# Patient Record
Sex: FEMALE | Race: BLACK OR AFRICAN AMERICAN | NOT HISPANIC OR LATINO | URBAN - METROPOLITAN AREA
[De-identification: names, ages, dates, MRNs, and addresses within clinical notes are randomized per-mention and may not be internally consistent; named-entity substitution may affect disease eponyms.]

---

## 2022-08-05 ENCOUNTER — EMERGENCY (EMERGENCY)
Facility: HOSPITAL | Age: 27
LOS: 1 days | Discharge: HOME | End: 2022-08-05
Attending: STUDENT IN AN ORGANIZED HEALTH CARE EDUCATION/TRAINING PROGRAM
Payer: COMMERCIAL

## 2022-08-05 VITALS
OXYGEN SATURATION: 98 % | HEART RATE: 80 BPM | DIASTOLIC BLOOD PRESSURE: 68 MMHG | SYSTOLIC BLOOD PRESSURE: 111 MMHG | RESPIRATION RATE: 18 BRPM | TEMPERATURE: 99 F | WEIGHT: 139.99 LBS

## 2022-08-05 LAB
ALBUMIN SERPL ELPH-MCNC: 4.3 G/DL — SIGNIFICANT CHANGE UP (ref 3.3–5.2)
ALP SERPL-CCNC: 94 U/L — SIGNIFICANT CHANGE UP (ref 40–120)
ALT FLD-CCNC: 15 U/L — SIGNIFICANT CHANGE UP
ANION GAP SERPL CALC-SCNC: 11 MMOL/L — SIGNIFICANT CHANGE UP (ref 5–17)
APPEARANCE UR: CLEAR — SIGNIFICANT CHANGE UP
AST SERPL-CCNC: 19 U/L — SIGNIFICANT CHANGE UP
BASOPHILS # BLD AUTO: 0.05 K/UL — SIGNIFICANT CHANGE UP (ref 0–0.2)
BASOPHILS NFR BLD AUTO: 0.9 % — SIGNIFICANT CHANGE UP (ref 0–2)
BILIRUB SERPL-MCNC: 0.2 MG/DL — LOW (ref 0.4–2)
BILIRUB UR-MCNC: NEGATIVE — SIGNIFICANT CHANGE UP
BUN SERPL-MCNC: 8.1 MG/DL — SIGNIFICANT CHANGE UP (ref 8–20)
CALCIUM SERPL-MCNC: 8.7 MG/DL — SIGNIFICANT CHANGE UP (ref 8.4–10.5)
CHLORIDE SERPL-SCNC: 101 MMOL/L — SIGNIFICANT CHANGE UP (ref 98–107)
CO2 SERPL-SCNC: 23 MMOL/L — SIGNIFICANT CHANGE UP (ref 22–29)
COLOR SPEC: YELLOW — SIGNIFICANT CHANGE UP
CREAT SERPL-MCNC: 0.55 MG/DL — SIGNIFICANT CHANGE UP (ref 0.5–1.3)
DIFF PNL FLD: NEGATIVE — SIGNIFICANT CHANGE UP
EGFR: 129 ML/MIN/1.73M2 — SIGNIFICANT CHANGE UP
EOSINOPHIL # BLD AUTO: 0.1 K/UL — SIGNIFICANT CHANGE UP (ref 0–0.5)
EOSINOPHIL NFR BLD AUTO: 1.7 % — SIGNIFICANT CHANGE UP (ref 0–6)
EPI CELLS # UR: SIGNIFICANT CHANGE UP
GIANT PLATELETS BLD QL SMEAR: PRESENT — SIGNIFICANT CHANGE UP
GLUCOSE SERPL-MCNC: 89 MG/DL — SIGNIFICANT CHANGE UP (ref 70–99)
GLUCOSE UR QL: NEGATIVE MG/DL — SIGNIFICANT CHANGE UP
HCG SERPL-ACNC: <4 MIU/ML — SIGNIFICANT CHANGE UP
HCT VFR BLD CALC: 32.6 % — LOW (ref 34.5–45)
HGB BLD-MCNC: 10.7 G/DL — LOW (ref 11.5–15.5)
KETONES UR-MCNC: ABNORMAL
LEUKOCYTE ESTERASE UR-ACNC: ABNORMAL
LYMPHOCYTES # BLD AUTO: 1.02 K/UL — SIGNIFICANT CHANGE UP (ref 1–3.3)
LYMPHOCYTES # BLD AUTO: 17.4 % — SIGNIFICANT CHANGE UP (ref 13–44)
MANUAL SMEAR VERIFICATION: SIGNIFICANT CHANGE UP
MCHC RBC-ENTMCNC: 29 PG — SIGNIFICANT CHANGE UP (ref 27–34)
MCHC RBC-ENTMCNC: 32.8 GM/DL — SIGNIFICANT CHANGE UP (ref 32–36)
MCV RBC AUTO: 88.3 FL — SIGNIFICANT CHANGE UP (ref 80–100)
MONOCYTES # BLD AUTO: 0.81 K/UL — SIGNIFICANT CHANGE UP (ref 0–0.9)
MONOCYTES NFR BLD AUTO: 13.9 % — SIGNIFICANT CHANGE UP (ref 2–14)
NEUTROPHILS # BLD AUTO: 3.87 K/UL — SIGNIFICANT CHANGE UP (ref 1.8–7.4)
NEUTROPHILS NFR BLD AUTO: 66.1 % — SIGNIFICANT CHANGE UP (ref 43–77)
NITRITE UR-MCNC: NEGATIVE — SIGNIFICANT CHANGE UP
PH UR: 5 — SIGNIFICANT CHANGE UP (ref 5–8)
PLAT MORPH BLD: NORMAL — SIGNIFICANT CHANGE UP
PLATELET # BLD AUTO: 278 K/UL — SIGNIFICANT CHANGE UP (ref 150–400)
POTASSIUM SERPL-MCNC: 3.7 MMOL/L — SIGNIFICANT CHANGE UP (ref 3.5–5.3)
POTASSIUM SERPL-SCNC: 3.7 MMOL/L — SIGNIFICANT CHANGE UP (ref 3.5–5.3)
PROCALCITONIN SERPL-MCNC: 0.08 NG/ML — SIGNIFICANT CHANGE UP (ref 0.02–0.1)
PROT SERPL-MCNC: 7.3 G/DL — SIGNIFICANT CHANGE UP (ref 6.6–8.7)
PROT UR-MCNC: 15
RBC # BLD: 3.69 M/UL — LOW (ref 3.8–5.2)
RBC # FLD: 18.8 % — HIGH (ref 10.3–14.5)
RBC BLD AUTO: NORMAL — SIGNIFICANT CHANGE UP
RBC CASTS # UR COMP ASSIST: NEGATIVE /HPF — SIGNIFICANT CHANGE UP (ref 0–4)
SMUDGE CELLS # BLD: PRESENT — SIGNIFICANT CHANGE UP
SODIUM SERPL-SCNC: 135 MMOL/L — SIGNIFICANT CHANGE UP (ref 135–145)
SP GR SPEC: 1.02 — SIGNIFICANT CHANGE UP (ref 1.01–1.02)
UROBILINOGEN FLD QL: NEGATIVE MG/DL — SIGNIFICANT CHANGE UP
WBC # BLD: 5.85 K/UL — SIGNIFICANT CHANGE UP (ref 3.8–10.5)
WBC # FLD AUTO: 5.85 K/UL — SIGNIFICANT CHANGE UP (ref 3.8–10.5)
WBC UR QL: SIGNIFICANT CHANGE UP /HPF (ref 0–5)

## 2022-08-05 PROCEDURE — 96375 TX/PRO/DX INJ NEW DRUG ADDON: CPT

## 2022-08-05 PROCEDURE — 87040 BLOOD CULTURE FOR BACTERIA: CPT

## 2022-08-05 PROCEDURE — 36415 COLL VENOUS BLD VENIPUNCTURE: CPT

## 2022-08-05 PROCEDURE — 87086 URINE CULTURE/COLONY COUNT: CPT

## 2022-08-05 PROCEDURE — 71045 X-RAY EXAM CHEST 1 VIEW: CPT

## 2022-08-05 PROCEDURE — 0225U NFCT DS DNA&RNA 21 SARSCOV2: CPT

## 2022-08-05 PROCEDURE — 80053 COMPREHEN METABOLIC PANEL: CPT

## 2022-08-05 PROCEDURE — 71045 X-RAY EXAM CHEST 1 VIEW: CPT | Mod: 26

## 2022-08-05 PROCEDURE — 81001 URINALYSIS AUTO W/SCOPE: CPT

## 2022-08-05 PROCEDURE — 96374 THER/PROPH/DIAG INJ IV PUSH: CPT

## 2022-08-05 PROCEDURE — 85025 COMPLETE CBC W/AUTO DIFF WBC: CPT

## 2022-08-05 PROCEDURE — 84145 PROCALCITONIN (PCT): CPT

## 2022-08-05 PROCEDURE — 99284 EMERGENCY DEPT VISIT MOD MDM: CPT

## 2022-08-05 PROCEDURE — 84702 CHORIONIC GONADOTROPIN TEST: CPT

## 2022-08-05 PROCEDURE — 99285 EMERGENCY DEPT VISIT HI MDM: CPT | Mod: 25

## 2022-08-05 RX ORDER — KETOROLAC TROMETHAMINE 30 MG/ML
15 SYRINGE (ML) INJECTION ONCE
Refills: 0 | Status: DISCONTINUED | OUTPATIENT
Start: 2022-08-05 | End: 2022-08-05

## 2022-08-05 RX ORDER — CEFTRIAXONE 500 MG/1
1000 INJECTION, POWDER, FOR SOLUTION INTRAMUSCULAR; INTRAVENOUS ONCE
Refills: 0 | Status: COMPLETED | OUTPATIENT
Start: 2022-08-05 | End: 2022-08-05

## 2022-08-05 RX ORDER — ACETAMINOPHEN 500 MG
975 TABLET ORAL ONCE
Refills: 0 | Status: COMPLETED | OUTPATIENT
Start: 2022-08-05 | End: 2022-08-05

## 2022-08-05 RX ORDER — SODIUM CHLORIDE 9 MG/ML
1000 INJECTION INTRAMUSCULAR; INTRAVENOUS; SUBCUTANEOUS ONCE
Refills: 0 | Status: COMPLETED | OUTPATIENT
Start: 2022-08-05 | End: 2022-08-05

## 2022-08-05 RX ADMIN — CEFTRIAXONE 100 MILLIGRAM(S): 500 INJECTION, POWDER, FOR SOLUTION INTRAMUSCULAR; INTRAVENOUS at 22:10

## 2022-08-05 RX ADMIN — Medication 15 MILLIGRAM(S): at 22:10

## 2022-08-05 RX ADMIN — SODIUM CHLORIDE 1000 MILLILITER(S): 9 INJECTION INTRAMUSCULAR; INTRAVENOUS; SUBCUTANEOUS at 22:10

## 2022-08-05 RX ADMIN — Medication 975 MILLIGRAM(S): at 22:09

## 2022-08-05 NOTE — ED PROVIDER NOTE - ATTENDING CONTRIBUTION TO CARE
EDIN Gomez: 27F hx of lymphoma on chemotherapy presenting for eval of fevers. Unclear what last neutrophil count was, is not on bacterial prophylaxis so will start ceftriaxone though if normal neutrophil count and negative CXR/UA likely stable to go outpt as she is very well appearing and by hx sounds more like a viral syndrome, does not have any active complaints but has chemo again this week so wanted to be evaluated given her ongoing fevers. Will check full sepsis panel including cultures given her possible immunocompromised status, does not have SIRS and does not warrant IVFB at this time -- if negative w/u d/c to follow up outpt and await RVP results.     I have personally performed a face to face diagnostic evaluation on this patient.  I have reviewed the resident's note and agree with the history, exam, and plan of care, except as noted.   My medical decision making and observations are found above.

## 2022-08-05 NOTE — ED PROVIDER NOTE - OBJECTIVE STATEMENT
28 yo female history of Lymphoma (currently on chemotherapy) presents complaining of intermittent fever over the past three days. Patient states that she has experienced fever to 101.4F this afternoon. She spoke to her Oncologist who advised her to come to the ED. Patient states that she last had ABV chemotherapy on 7/25. She is being treated by Dr. Saldana at [facility] in [place] where she resides. Patient states that she is experiencing headache and some mild diarrhea. Denies sore throat, cough, abd pain, vomiting, urinary complaints, focal weakness/numbness/tingling in extremities.

## 2022-08-05 NOTE — ED PROVIDER NOTE - CLINICAL SUMMARY MEDICAL DECISION MAKING FREE TEXT BOX
28 yo female hx of Lymphoma presents with fever past three days. Will obtain appropriate labs, cover w rocephin. IV fluids, PO Tylenol. Monitor and reassess.

## 2022-08-05 NOTE — ED PROVIDER NOTE - PROGRESS NOTE DETAILS
[facility]: Workup negative, patient not neutropenic. Discussed w patient need to call hematologist at [facility] regarding her symptoms and resuming chemotherapy. Discussed return precautions. Patient comfortable with discharge and understands the plan.

## 2022-08-05 NOTE — ED ADULT TRIAGE NOTE - CHIEF COMPLAINT QUOTE
pt with CA lymphoma, c/o fever for the past 3 days, left chest wall access port, last chemo done on july 25.

## 2022-08-05 NOTE — ED ADULT TRIAGE NOTE - DIRECT TO ROOM CARE INITIATED:
[facility] MEDICINE PROGRESS NOTE   Patient: Cherri Lloyd  Today's Date: 2/14/2022    YOB: 1981  Admission Date: 2/13/2022    MRN: [id number]  Inpatient LOS: 0    Room: [facility]/  Hospital Day: Hospital Day: 2    Subjective   HISTORY AND SUBJECTIVE COMPLAINTS     Chief Complaint:     Abdominal pain.    Interval History / Subjective:     No major overnight events.  Patient did not get IV narcotics or HIDA scan planning for about 8 hours.  She feels overall better when compared to admission.  Pain over the abdomen is currently at 6/10.  Feeling slightly nauseous.  No fever or chills.  No chest pain or shortness of breath.  No diarrhea.  Wants to eat.    Hospital Course:  Cherri Lloyd is a 40 year old female who presented on 2/13/2022 with complaints of Abdominal Pain  .    ROS:  Pertinent systems negative except as above.    Objective   PHYSICAL EXAMINATION     Vital 24 Hour Range Most Recent Value   Temperature Temp  Min: 96.5 °F (35.8 °C)  Max: 98.3 °F (36.8 °C) 97.5 °F (36.4 °C)   Pulse Pulse  Min: 67  Max: 86 86   Respiratory Resp  Min: 18  Max: 18 18   Blood Pressure BP  Min: 119/82  Max: 139/86 124/78   Pulse Oximetry SpO2  Min: 91 %  Max: 99 % 97 %   Arterial BP No data recorded     O2 No data recorded       Recorded Intake and Output:    Intake/Output Summary (Last 24 hours) at 2/14/2022 0806  Last data filed at 2/14/2022 0324  Gross per 24 hour   Intake 2504 ml   Output --   Net 2504 ml      Recorded Last Stool Occurrence:       Vital Most Recent Value First Value   Weight 63.5 kg (140 lb) Weight: 63.5 kg (140 lb)   Height 5' 4\" (162.6 cm) Height: 5' 4\" (162.6 cm)   BMI 24.03 N/A     General Appearance - Alert,  no acute distress  HEENT - mild conjunctival icterus, Normocephalic, PERRL,   Lungs - Clear to auscultation bilaterally, respirations unlabored, no wheezing   Heart - regular rate and rhythm,  S1 and S2 normal.  No murmur, rub or gallop   Abdomen - Soft,   bowel sounds hypo active all four quadrants, no masses, no hepatosplenomegaly, no hernia.   to palpate over the epigastric and periumbilical region, mild tenderness in the right upper quadrant, no guarding or rigidity  Extremities -  no peripheral edema   Neurologic - no focal deficits.    TEST RESULTS     Labs: The Laboratory values listed below have been reviewed and pertinent findings discussed in the Assessment and Plan.    Laboratory values:   Recent Labs   Lab 02/14/22  0604 02/13/22  0604   WBC 6.2 10.1   HGB 13.3 16.2*   HCT 38.2 46.2   * 218       Recent Labs   Lab 02/14/22  0605 02/13/22  0604   SODIUM 133* 130*   POTASSIUM 3.8 3.3*   CHLORIDE 103 97*   CO2 25 16*   CALCIUM 8.0* 10.0   GLUCOSE 101* 67*   BUN 2* 6   CREATININE 0.51 0.67        Recent Labs   Lab 02/14/22  0605 02/14/22  0604 02/13/22  0604   ALBUMIN 3.2*  --  4.4   *  --  406*   *  --  186*   GGTP  --  526*  --    BILIRUBIN 1.9*  --  1.7*       Radiology: Imaging studies have been reviewed and pertinent findings discussed in the Assessment and Plan.  Results for orders placed or performed during the hospital encounter of 02/13/22 (from the past 48 hour(s))   US Liver / Gallbladder / Pancreas    Impression    IMPRESSION:  1. Diffuse fatty infiltration in the liver with no mass lesion or contour  irregularity.  2. As visualized, the pancreas is unremarkable (but poorly visualized).  3. No evidence for cholelithiasis or cholecystitis. The patient did have a  positive sonographic Nova sign. Correlation with clinical history and  labs is recommended.   CT ABDOMEN PELVIS W CONTRAST    Impression    IMPRESSION:  1.  Findings of pancreatitis. Mild reduced enhancement of the distal body  and tail of the pancreas, likely secondary to pancreatic edema. Trace free  fluid surrounding the pancreas. No loculated fluid collection.  2.  Severe hepatic steatosis.        ANCILLARY ORDERS     Diet:  Npo Diet With Exceptions;  Sips Of Water, Ice Chips, Medications  Telemetry: Off  Consults:    None  Therapy Orders:   No orders of the defined types were placed in this encounter.      Advance Care Planning    ADVANCED DIRECTIVES     Code Status: Full Resuscitation          ASSESSMENT AND PLAN       Acute interstitial pancreatitis, recurrent episodes -?  Related to alcoholic pancreatitis ,?  From gallbladder disease.  CT scan of the abdomen and pelvis showing interstitial pancreatitis, no focalized fluid collection/phlegmon.    Had multiple MRI MRCP is (2017, 2020), that showed nondilated biliary system, no choledocholithiasis  Patient declined excessive alcohol consumption.  Lipase level elevated on admission.  Clinically improving.  LFTs, lipase level improving.    Triglyceride level within normal limit, IgG pending.  Start clear liquid diet   Monitor LFTs and lipase level.     Abnormal LFTs -multifactorial   Could be related to alcoholic hepatitis,?  Biliary dyskinesia/functional gallbladder disease/chronic cholecystitis.  Ultrasound of the liver/gallbladder showing severe hepatic steatosis.  No cholelithiasis, choledocholithiasis, cholecystitis.    HIDA scan negative for cystic or common bile duct obstruction.  Low gallbladder ejection fraction concerning for function gallbladder disease/biliary dyskinesia or chronic cholecystitis   Surgery consulted for evaluation  LFTs improving.  GGT elevated, hepatitis panel negative.  Monitor LFTs     Hyponatremia -hypotonic hyponatremia-improving  Hypokalemia-secondary to poor nutritional intake-improved  Dehydration as evidenced by hemoconcentration on admission secondary to pancreatitis-improving  Hypoglycemia, symptomatic-secondary to poor nutritional intake-improved  Continue fluids,  Clear liquid diet   Electrolyte supplementation p.r.n.    Acquired hypothyroidism-continue home Synthroid.  History of moderate persistent asthma-stable   P.r.n. albuterol inhaler.     DEEP VEIN THROMBOSIS  PROPHYLAXIS:  SCD, Lovenox     Goals of Care:  Patient is decisional:  Yes  Patient has power of  documents in the chart:  No  Code Status:  Full Code    Smoking status: non smoker    Nutrition status: appropriate  Body mass index is 24.03 kg/m². - appropriate weight BMI 18.5-24  DVT Prophylaxis: Lovenox prophylactic dosing (dose adjusted as per renal function)         DISCHARGE PLANNING     The patient's treatment plans were discussed with patient, RN and consultant(s).     Recommendations for Discharge   SW     PT     OT     SLP        Anticipated discharge destination: Home  Expected Discharge Date: TBA,? 2/15/22  Barriers to Discharge: Patient is not medically ready for discharge.        Prince DELLA Reyes MD  Hospitalist  2/14/2022  8:06 AM     05-Aug-2022 20:12

## 2022-08-05 NOTE — ED PROVIDER NOTE - PATIENT PORTAL LINK FT
You can access the FollowMyHealth Patient Portal offered by [facility] by registering at the following website: [email or web address]. By joining [employer]’s FollowMyHealth portal, you will also be able to view your health information using other applications (apps) compatible with our system.

## 2022-08-05 NOTE — ED PROVIDER NOTE - PHYSICAL EXAMINATION
Gen: Well appearing in NAD  Head: NC/AT  Neck: trachea midline  Resp:  No distress, lungs cta b/l  Cardiac: Heart rrr. No murmur.   Abdomen: Soft, nontender, nondistended.   Ext: no deformities  Neuro:  A&O appears non focal  Skin:  Warm and dry as visualized  Psych:  Normal affect and mood

## 2022-08-06 LAB
B PERT DNA SPEC QL NAA+PROBE: SIGNIFICANT CHANGE UP
C PNEUM DNA SPEC QL NAA+PROBE: SIGNIFICANT CHANGE UP
FLUAV H1 2009 PAND RNA SPEC QL NAA+PROBE: SIGNIFICANT CHANGE UP
FLUAV H1 RNA SPEC QL NAA+PROBE: SIGNIFICANT CHANGE UP
FLUAV H3 RNA SPEC QL NAA+PROBE: SIGNIFICANT CHANGE UP
FLUAV SUBTYP SPEC NAA+PROBE: SIGNIFICANT CHANGE UP
FLUBV RNA SPEC QL NAA+PROBE: SIGNIFICANT CHANGE UP
HADV DNA SPEC QL NAA+PROBE: SIGNIFICANT CHANGE UP
HCOV PNL SPEC NAA+PROBE: SIGNIFICANT CHANGE UP
HMPV RNA SPEC QL NAA+PROBE: SIGNIFICANT CHANGE UP
HPIV1 RNA SPEC QL NAA+PROBE: SIGNIFICANT CHANGE UP
HPIV2 RNA SPEC QL NAA+PROBE: SIGNIFICANT CHANGE UP
HPIV3 RNA SPEC QL NAA+PROBE: SIGNIFICANT CHANGE UP
HPIV4 RNA SPEC QL NAA+PROBE: SIGNIFICANT CHANGE UP
RAPID RVP RESULT: DETECTED
RV+EV RNA SPEC QL NAA+PROBE: SIGNIFICANT CHANGE UP
SARS-COV-2 RNA SPEC QL NAA+PROBE: DETECTED

## 2022-08-07 LAB
CULTURE RESULTS: SIGNIFICANT CHANGE UP
SPECIMEN SOURCE: SIGNIFICANT CHANGE UP

## 2022-08-11 LAB
CULTURE RESULTS: SIGNIFICANT CHANGE UP
CULTURE RESULTS: SIGNIFICANT CHANGE UP
SPECIMEN SOURCE: SIGNIFICANT CHANGE UP
SPECIMEN SOURCE: SIGNIFICANT CHANGE UP